# Patient Record
Sex: MALE | Race: BLACK OR AFRICAN AMERICAN | NOT HISPANIC OR LATINO
[De-identification: names, ages, dates, MRNs, and addresses within clinical notes are randomized per-mention and may not be internally consistent; named-entity substitution may affect disease eponyms.]

---

## 2017-02-22 ENCOUNTER — APPOINTMENT (OUTPATIENT)
Dept: SURGERY | Facility: CLINIC | Age: 62
End: 2017-02-22

## 2017-03-08 ENCOUNTER — APPOINTMENT (OUTPATIENT)
Dept: SURGERY | Facility: CLINIC | Age: 62
End: 2017-03-08

## 2017-03-08 VITALS
SYSTOLIC BLOOD PRESSURE: 117 MMHG | RESPIRATION RATE: 16 BRPM | TEMPERATURE: 98 F | DIASTOLIC BLOOD PRESSURE: 78 MMHG | HEART RATE: 56 BPM | OXYGEN SATURATION: 98 %

## 2017-03-08 DIAGNOSIS — L29.0 PRURITUS ANI: ICD-10-CM

## 2017-03-08 RX ORDER — PSYLLIUM SEED
28.3 PACKET (EA) ORAL
Refills: 0 | Status: ACTIVE | COMMUNITY

## 2023-10-20 ENCOUNTER — EMERGENCY (EMERGENCY)
Facility: HOSPITAL | Age: 68
LOS: 1 days | Discharge: HOME | End: 2023-10-20
Attending: STUDENT IN AN ORGANIZED HEALTH CARE EDUCATION/TRAINING PROGRAM | Admitting: STUDENT IN AN ORGANIZED HEALTH CARE EDUCATION/TRAINING PROGRAM
Payer: MEDICARE

## 2023-10-20 VITALS
SYSTOLIC BLOOD PRESSURE: 161 MMHG | RESPIRATION RATE: 15 BRPM | WEIGHT: 195.11 LBS | OXYGEN SATURATION: 98 % | TEMPERATURE: 98 F | HEIGHT: 70 IN | HEART RATE: 63 BPM | DIASTOLIC BLOOD PRESSURE: 69 MMHG

## 2023-10-20 PROCEDURE — 99283 EMERGENCY DEPT VISIT LOW MDM: CPT

## 2023-10-20 NOTE — ED PROVIDER NOTE - OBJECTIVE STATEMENT
Maddie ADRIAN: 68-year-old male, presents from dental office with a chief complaint of worsening right lower molar area lesion, patient reports he has had multiple dental extractions in the past, has been having persistent pain and discomfort to the right side for some time associate with difficulty chewing on that side, otherwise is able to swallow, which has been getting worse since April, was seen by dentist earlier today was told to come to ED for further evaluation.  Denies any weight loss fevers chills nausea vomiting chest pain trouble breathing.  No lesions elsewhere.

## 2023-10-20 NOTE — ED PROVIDER NOTE - NSFOLLOWUPINSTRUCTIONS_ED_ALL_ED_FT
Follow-up in the dental clinic with an oral pathologist, please call [phone] to schedule an appointment  Follow-up with your primary care doctor within 1 week  Take Tylenol 650 mg every 6 hours as needed for pain  Return to the ER with any worsening or concerning symptoms, fever/chills, increased swelling or pain, facial swelling, neck swelling or any other concerns.

## 2023-10-20 NOTE — ED PROVIDER NOTE - CLINICAL SUMMARY MEDICAL DECISION MAKING FREE TEXT BOX
Maddie ADRIAN: Exam vital signs stable nontoxic-appearing physical exam as above, DDx concern for possible neoplastic oral lesion, less concern for acute infectious given chronicity of symptoms no obvious fluctuance no purulence expressed, and characteristics of lesion, will discuss with dental team to arrange expedited follow-up, patient would benefit from further outpatient evaluation with imaging with consideration for possible biopsy.  Low concern for acute airway, eyes or systemic infection at this time, patient is very well-appearing with reassuring vitals and no acute distress.  Will reassess attempt to obtain expedited outpatient follow-up and anticipate discharge. Plan of care and further outpatient eval explained to patient. Strict return precautions were discussed. Pt expressed understanding.

## 2023-10-20 NOTE — ED PROVIDER NOTE - PHYSICAL EXAMINATION
Maddie ADRIAN:  VITALS: Initial triage and subsequent vitals have been reviewed by me.  GEN APPEARANCE: Alert, cooperative. Non-toxic appearing. Well appearing. NAD.  HEAD: Atraumatic, normocephalic   EYES: PERRLa, EOMI, vision grossly intact.   EARS: Gross hearing intact.   NOSE: No nasal discharge, no external evidence of epistaxis.   THROAT: MMM. R lower molar area w 4cm area of diffuse swelling, exudative changes, lesion w irregular borders, structure and contour, non fluctuant.   NECK: Supple  CV: RRR, S1S2, no c/r/m/g. No cyanosis. Extremities warm, well perfused. Cap refill <2 seconds. No bruits.   LUNGS: CTAB. No wheezing. No rales. No rhonchi. No diminished breath sounds.   ABDOMEN: Soft, NTND. No guarding or rebound. No masses.   MSK/EXT: Spine appears normal, no spine point tenderness. No CVA ttp. Normal muscular development. Pelvis stable. No obvious joint or bony deformity, no peripheral edema.   NEURO: Alert, follows commands. Weight bearing normal. Speech normal. Sensation and motor normal x4 extremities.   SKIN: Normal color for race, warm, dry and intact. No evidence of rash.  PSYCH: Normal mood and affect.

## 2023-10-20 NOTE — ED PROVIDER NOTE - ATTENDING APP SHARED VISIT CONTRIBUTION OF CARE
I have personally performed a face to face medical and diagnostic evaluation of the patient. I have discussed with and reviewed the JOHN's note and agree with the History, ROS, Physical Exam and MDM unless otherwise indicated. A brief summary of my personal evaluation and impression can be found below.    Maddie ADRIAN: Please see the HPI, ROS, PE and MDM as authored by me.

## 2023-10-20 NOTE — ED PROVIDER NOTE - PATIENT PORTAL LINK FT
You can access the FollowMyHealth Patient Portal offered by [facility] by registering at the following website: [email or web address]. By joining [facility]’s FollowMyHealth portal, you will also be able to view your health information using other applications (apps) compatible with our system.

## 2023-10-20 NOTE — ED PROVIDER NOTE - PROGRESS NOTE DETAILS
MAGDALENA Oseguera: Spoke with dental resident who provided information for oral pathology for patient to follow-up, patient to call [phone] to make an appointment or he can also go to the dental clinic now to schedule an appointment.  Discussed at length with patient need for follow-up with an oral pathologist oral surgeon.  Patient also provided with dental clinic information.  Patient will return to the ER with any worsening or concerning symptoms.

## 2023-10-20 NOTE — ED ADULT TRIAGE NOTE - CHIEF COMPLAINT QUOTE
pt aox4, ambulatory sent from dental office for further evaluation for possible lesion to inside of left side of mouth. pt had 2 dental extractions since April, last dose of abx yesterday. denies fevers, no difficulty swallowing. airway patent. has not taken anything for pain. denies any known medical hx.

## 2023-11-13 PROBLEM — Z78.9 OTHER SPECIFIED HEALTH STATUS: Chronic | Status: ACTIVE | Noted: 2023-10-20

## 2023-11-20 ENCOUNTER — APPOINTMENT (OUTPATIENT)
Dept: OTOLARYNGOLOGY | Facility: CLINIC | Age: 68
End: 2023-11-20
Payer: MEDICARE

## 2023-11-20 PROCEDURE — 99204 OFFICE O/P NEW MOD 45 MIN: CPT | Mod: 25

## 2023-11-20 PROCEDURE — 31575 DIAGNOSTIC LARYNGOSCOPY: CPT
